# Patient Record
Sex: MALE | Race: BLACK OR AFRICAN AMERICAN | URBAN - METROPOLITAN AREA
[De-identification: names, ages, dates, MRNs, and addresses within clinical notes are randomized per-mention and may not be internally consistent; named-entity substitution may affect disease eponyms.]

---

## 2021-05-04 ENCOUNTER — APPOINTMENT (RX ONLY)
Dept: URBAN - METROPOLITAN AREA CLINIC 41 | Facility: CLINIC | Age: 31
Setting detail: DERMATOLOGY
End: 2021-05-04

## 2021-05-04 DIAGNOSIS — L72.8 OTHER FOLLICULAR CYSTS OF THE SKIN AND SUBCUTANEOUS TISSUE: ICD-10-CM

## 2021-05-04 PROCEDURE — ? ADDITIONAL NOTES

## 2021-05-04 PROCEDURE — 99202 OFFICE O/P NEW SF 15 MIN: CPT

## 2021-05-04 PROCEDURE — ? COUNSELING

## 2021-05-04 ASSESSMENT — LOCATION ZONE DERM: LOCATION ZONE: FACE

## 2021-05-04 ASSESSMENT — LOCATION SIMPLE DESCRIPTION DERM: LOCATION SIMPLE: LEFT CHEEK

## 2021-05-04 ASSESSMENT — LOCATION DETAILED DESCRIPTION DERM: LOCATION DETAILED: LEFT MEDIAL MALAR CHEEK

## 2021-05-04 NOTE — PROCEDURE: ADDITIONAL NOTES
Additional Notes: SP notes that the next step would be doxycycline but the patient has already been on two antibiotics in the past month. SP notes that the pt could have this removed surgically. SP notes that she will consult with BG to see if he is willing to remove it or if we should refer to head and neck surgeon.\\n\\nBG advises pt to see plastic surgeon for excision as it is very deep and large. Notes it’s not inflamed so abx wouldn’t make a difference. Dr. Juanito Zhu in [place] - contact info given for pt\\n\\nAlso discussed checking with Dr. Del Cid first for consult to see if he could do it before seeing plastic surgeon. Pt voiced understanding Additional Notes: SP notes that the next step would be doxycycline but the patient has already been on two antibiotics in the past month. SP notes that the pt could have this removed surgically. SP notes that she will consult with BG to see if he is willing to remove it or if we should refer to head and neck surgeon.\\n\\nBG advises pt to see plastic surgeon for excision as it is very deep and large. Notes it’s not inflamed so abx wouldn’t make a difference. Dr. Juanito Zhu in [place] - contact info given for pt\\n\\nAlso discussed checking with Dr. Del Cid first for consult to see if he could do it before seeing plastic surgeon. Pt voiced understanding

## 2021-05-04 NOTE — HPI: CYST
How Severe Is Your Cyst?: mild
Is This A New Presentation, Or A Follow-Up?: Cyst
Additional History: Amoxicillin was given 4 weeks ago (10 day course) helped. Sulfamethoxazole was given after but this is not helping \\n

## 2021-05-05 ENCOUNTER — APPOINTMENT (RX ONLY)
Dept: URBAN - METROPOLITAN AREA CLINIC 41 | Facility: CLINIC | Age: 31
Setting detail: DERMATOLOGY
End: 2021-05-05

## 2021-05-05 DIAGNOSIS — L72.8 OTHER FOLLICULAR CYSTS OF THE SKIN AND SUBCUTANEOUS TISSUE: ICD-10-CM

## 2021-05-05 PROCEDURE — ? CONSULTATION EXCISION

## 2021-05-05 PROCEDURE — ? SURGICAL DECISION MAKING

## 2021-05-05 PROCEDURE — 99203 OFFICE O/P NEW LOW 30 MIN: CPT

## 2021-05-05 PROCEDURE — ? ADDITIONAL NOTES

## 2021-05-05 NOTE — HPI: CYST
Is This A New Presentation, Or A Follow-Up?: Cyst
Additional History: Patient would like to discuss removal options.

## 2021-05-05 NOTE — PROCEDURE: ADDITIONAL NOTES
Additional Notes: Patient consent was obtained to proceed with the visit and recommended plan of care after discussion of all risks and benefits, including the risks of COVID-19 exposure.
Detail Level: Simple
Render Risk Assessment In Note?: no
Additional Notes: Patient denies history of drainage.

## 2021-05-05 NOTE — PROCEDURE: MIPS QUALITY
Detail Level: Detailed
Quality 130: Documentation Of Current Medications In The Medical Record: Current Medications Documented
Quality 226: Preventive Care And Screening: Tobacco Use: Screening And Cessation Intervention: Tobacco Screening not Performed for Unknown Reasons
Quality 110: Preventive Care And Screening: Influenza Immunization: Influenza Immunization previously received during influenza season
Quality 431: Preventive Care And Screening: Unhealthy Alcohol Use - Screening: Unhealthy alcohol use screening not performed, reason not otherwise specified

## 2021-05-17 ENCOUNTER — APPOINTMENT (RX ONLY)
Dept: URBAN - METROPOLITAN AREA CLINIC 41 | Facility: CLINIC | Age: 31
Setting detail: DERMATOLOGY
End: 2021-05-17

## 2021-05-17 DIAGNOSIS — L72.8 OTHER FOLLICULAR CYSTS OF THE SKIN AND SUBCUTANEOUS TISSUE: ICD-10-CM

## 2021-05-17 PROCEDURE — ? ADDITIONAL NOTES

## 2021-05-17 PROCEDURE — 11443 EXC FACE-MM B9+MARG 2.1-3 CM: CPT

## 2021-05-17 PROCEDURE — 13132 CMPLX RPR F/C/C/M/N/AX/G/H/F: CPT

## 2021-05-17 PROCEDURE — ? EXCISION

## 2021-05-17 ASSESSMENT — LOCATION SIMPLE DESCRIPTION DERM: LOCATION SIMPLE: LEFT CHEEK

## 2021-05-17 ASSESSMENT — LOCATION ZONE DERM: LOCATION ZONE: FACE

## 2021-05-17 ASSESSMENT — LOCATION DETAILED DESCRIPTION DERM: LOCATION DETAILED: LEFT INFERIOR CENTRAL MALAR CHEEK

## 2021-05-17 NOTE — PROCEDURE: EXCISION
Ear Star Wedge Flap Text: The defect edges were debeveled with a #15 blade scalpel.  Given the location of the defect and the proximity to free margins (helical rim) an ear star wedge flap was deemed most appropriate.  Using a sterile surgical marker, the appropriate flap was drawn incorporating the defect and placing the expected incisions between the helical rim and antihelix where possible.  The area thus outlined was incised through and through with a #15 scalpel blade. Partial Purse String (Intermediate) Text: Given the location of the defect and the characteristics of the surrounding skin an intermediate purse string closure was deemed most appropriate.  Undermining was performed circumferentially around the surgical defect.  A purse string suture was then placed and tightened. Wound tension of the circular defect prevented complete closure of the wound.

## 2021-05-17 NOTE — PROCEDURE: EXCISION
Rheumatology   [facility]   [address] [facility]  [place] [address]  [phone]   A-T Advancement Flap Text: The defect edges were debeveled with a #15 scalpel blade.  Given the location of the defect, shape of the defect and the proximity to free margins an A-T advancement flap was deemed most appropriate.  Using a sterile surgical marker, an appropriate advancement flap was drawn incorporating the defect and placing the expected incisions within the relaxed skin tension lines where possible.    The area thus outlined was incised deep to adipose tissue with a #15 scalpel blade.  The skin margins were undermined to an appropriate distance in all directions utilizing iris scissors.
